# Patient Record
Sex: FEMALE | Race: WHITE | URBAN - METROPOLITAN AREA
[De-identification: names, ages, dates, MRNs, and addresses within clinical notes are randomized per-mention and may not be internally consistent; named-entity substitution may affect disease eponyms.]

---

## 2023-09-27 ENCOUNTER — WEB ENCOUNTER (OUTPATIENT)
Dept: URBAN - METROPOLITAN AREA CLINIC 19 | Facility: CLINIC | Age: 38
End: 2023-09-27

## 2023-09-27 ENCOUNTER — OFFICE VISIT (OUTPATIENT)
Dept: URBAN - METROPOLITAN AREA CLINIC 19 | Facility: CLINIC | Age: 38
End: 2023-09-27
Payer: COMMERCIAL

## 2023-09-27 ENCOUNTER — DASHBOARD ENCOUNTERS (OUTPATIENT)
Age: 38
End: 2023-09-27

## 2023-09-27 VITALS
BODY MASS INDEX: 22.49 KG/M2 | DIASTOLIC BLOOD PRESSURE: 79 MMHG | WEIGHT: 148.4 LBS | HEART RATE: 61 BPM | HEIGHT: 68 IN | SYSTOLIC BLOOD PRESSURE: 119 MMHG | TEMPERATURE: 97.1 F

## 2023-09-27 DIAGNOSIS — R19.7 ACUTE DIARRHEA: ICD-10-CM

## 2023-09-27 DIAGNOSIS — R11.14 BILIOUS VOMITING WITH NAUSEA: ICD-10-CM

## 2023-09-27 DIAGNOSIS — K92.1 HEMATOCHEZIA: ICD-10-CM

## 2023-09-27 DIAGNOSIS — R10.84 ABDOMINAL CRAMPING, GENERALIZED: ICD-10-CM

## 2023-09-27 PROCEDURE — 99204 OFFICE O/P NEW MOD 45 MIN: CPT | Performed by: NURSE PRACTITIONER

## 2023-09-27 RX ORDER — FAMOTIDINE 40 MG/1
1 TABLET TABLET, FILM COATED ORAL TWICE A DAY
Qty: 60 TABLET | Refills: 1 | OUTPATIENT
Start: 2023-09-27

## 2023-09-27 RX ORDER — DICYCLOMINE HYDROCHLORIDE 20 MG/1
TABLET ORAL
Qty: 0 | Refills: 0 | Status: DISCONTINUED | COMMUNITY
Start: 1900-01-01

## 2023-09-27 RX ORDER — ZOLMITRIPTAN 5 MG/1
TAKE 1 TABLET (5 MG) BY ORAL ROUTE ONCE; IF HEADACHE RETURNS, THE DOSE MAY BE REPEATED AFTER 2 HOURS, NOT TO EXCEED 10 MG WITHIN 24 HOURS TABLET ORAL
Qty: 0 | Refills: 0 | Status: ACTIVE | COMMUNITY
Start: 1900-01-01

## 2023-09-27 RX ORDER — ZOLMITRIPTAN 5 MG/1
TAKE 1 TABLET (5 MG) BY ORAL ROUTE ONCE; IF HEADACHE RETURNS, THE DOSE MAY BE REPEATED AFTER 2 HOURS, NOT TO EXCEED 10 MG WITHIN 24 HOURS TABLET, FILM COATED ORAL
Qty: 0 | Refills: 0 | Status: DISCONTINUED | COMMUNITY
Start: 1900-01-01

## 2023-09-27 RX ORDER — DICYCLOMINE HYDROCHLORIDE 10 MG/1
2 CAPSULES AS NEEDED FOR PAIN CAPSULE ORAL THREE TIMES A DAY
Qty: 180 | Refills: 1 | OUTPATIENT
Start: 2023-09-27 | End: 2023-11-25

## 2023-09-27 RX ORDER — ERGOCALCIFEROL 1.25 MG/1
CAPSULE ORAL
Qty: 0 | Refills: 0 | Status: DISCONTINUED | COMMUNITY
Start: 1900-01-01

## 2023-09-27 RX ORDER — FERROUS SULFATE TAB EC 325 MG (65 MG FE EQUIVALENT) 325 (65 FE) MG
TABLET DELAYED RESPONSE ORAL
Qty: 0 | Refills: 0 | Status: DISCONTINUED | COMMUNITY
Start: 1900-01-01

## 2023-09-27 RX ORDER — IBUPROFEN 200 MG
TABLET ORAL
Qty: 0 | Refills: 0 | Status: DISCONTINUED | COMMUNITY
Start: 1900-01-01

## 2023-09-27 RX ORDER — NAPROXEN 500 MG/1
TABLET ORAL
Qty: 0 | Refills: 0 | Status: ACTIVE | COMMUNITY
Start: 1900-01-01

## 2023-09-27 NOTE — HPI-ZZZTODAY'S VISIT
Previous procedures  EGD 2018 with Dr. Carlson chronic erosive gastritis, bilious gastric fluid, BX negative for H. pylori or malignancy Colonoscopy 2016 nonbleeding internal hemorrhoids otherwise normal HX of h pylori  38-year-old female presents to the office urgently for abdominal pain.  She complains of left side abdominal pain for a week, along with nausea/vomiting, diarrhea and bloody stools and melena x1. Was being treated for URI with abx (cefdinir) and steroids about 2 weeks ago then all her GI symptoms started last week.  She was seen at  [facility] emergency room 9/20/2023 for left-sided rib pain, worse with certain movements and deep breath, brief loss of vision in both eyes seeing spots, headache. Labs WBC 17.3, hemoglobin normal, platelets normal, LFTs normal.  CT of the head normal. She then presented to [facility] 9/22/2023 for nausea vomiting, abdominal pain.  CT scan impression that she has on her phone showed no definite acute area of inflammation in the abdomen or pelvis, diffuse wall thickening of the hepatic flexure region of the colon favored to be from lack of distention over colitis, small cystic changes in the right ovary.  Would like to see full report.   She has left mid side pain, non tender to touch, bloating, nausea without vomiting. stools are watery loose about 3-4 times a day, no blood in stools.   PLAN Stool studies, bentyl, pantoprazole, labs, check h pylori

## 2023-09-27 NOTE — PHYSICAL EXAM CONSTITUTIONAL:
normal,  alert,  pleasant, well nourished, in no acute distress,  well developed, well nourished,  ambulating without difficulty

## 2023-09-28 LAB
A/G RATIO: 1.9
ABSOLUTE BASOPHILS: 22
ABSOLUTE EOSINOPHILS: 22
ABSOLUTE LYMPHOCYTES: 1332
ABSOLUTE MONOCYTES: 331
ABSOLUTE NEUTROPHILS: 5494
ALBUMIN: 4.6
ALKALINE PHOSPHATASE: 51
ALT (SGPT): 20
AST (SGOT): 16
BASOPHILS: 0.3
BILIRUBIN, TOTAL: 0.6
BUN/CREATININE RATIO: (no result)
BUN: 15
C-REACTIVE PROTEIN, QUANT: 0.4
CALCIUM: 9.8
CARBON DIOXIDE, TOTAL: 24
CHLORIDE: 104
CREATININE: 0.8
EGFR: 98
EOSINOPHILS: 0.3
GLOBULIN, TOTAL: 2.4
GLUCOSE: 86
HEMATOCRIT: 42.5
HEMOGLOBIN: 14.8
LYMPHOCYTES: 18.5
MCH: 31.6
MCHC: 34.8
MCV: 90.8
MONOCYTES: 4.6
MPV: 10.1
NEUTROPHILS: 76.3
PLATELET COUNT: 241
POTASSIUM: 3.9
PROTEIN, TOTAL: 7
RDW: 12.8
RED BLOOD CELL COUNT: 4.68
SODIUM: 139
WHITE BLOOD CELL COUNT: 7.2

## 2023-09-29 ENCOUNTER — LAB OUTSIDE AN ENCOUNTER (OUTPATIENT)
Dept: URBAN - METROPOLITAN AREA CLINIC 19 | Facility: CLINIC | Age: 38
End: 2023-09-29

## 2023-10-03 LAB
ADENOVIRUS F 40/41: NOT DETECTED
CALPROTECTIN, STOOL - QDX: (no result)
CAMPYLOBACTER: NOT DETECTED
CLOSTRIDIUM DIFFICILE: NOT DETECTED
ENTAMOEBA HISTOLYTICA: NOT DETECTED
ENTEROAGGREGATIVE E.COLI: NOT DETECTED
ENTEROTOXIGENIC E.COLI: NOT DETECTED
ESCHERICHIA COLI O157: NOT DETECTED
GIARDIA LAMBLIA: NOT DETECTED
H. PYLORI (EIA) - QDX: NEGATIVE
NOROVIRUS GI/GII: NOT DETECTED
ROTAVIRUS A: NOT DETECTED
SALMONELLA SPP.: NOT DETECTED
SHIGA-LIKE TOXIN PRODUCING E.COLI: NOT DETECTED
SHIGELLA SPP. / ENTEROINVASIVE E.COLI: NOT DETECTED
VIBRIO PARAHAEMOLYTICUS: NOT DETECTED
VIBRIO SPP.: NOT DETECTED
YERSINIA ENTEROCOLITICA: NOT DETECTED